# Patient Record
Sex: MALE | Race: OTHER | HISPANIC OR LATINO | Employment: UNEMPLOYED | URBAN - METROPOLITAN AREA
[De-identification: names, ages, dates, MRNs, and addresses within clinical notes are randomized per-mention and may not be internally consistent; named-entity substitution may affect disease eponyms.]

---

## 2024-10-02 PROCEDURE — 96376 TX/PRO/DX INJ SAME DRUG ADON: CPT

## 2024-10-02 PROCEDURE — 99291 CRITICAL CARE FIRST HOUR: CPT

## 2024-10-03 ENCOUNTER — HOSPITAL ENCOUNTER (INPATIENT)
Facility: HOSPITAL | Age: 29
LOS: 1 days | Discharge: HOME | DRG: 310 | End: 2024-10-04
Attending: STUDENT IN AN ORGANIZED HEALTH CARE EDUCATION/TRAINING PROGRAM | Admitting: HOSPITALIST

## 2024-10-03 DIAGNOSIS — I48.91 ATRIAL FIBRILLATION WITH RAPID VENTRICULAR RESPONSE: Primary | ICD-10-CM

## 2024-10-03 DIAGNOSIS — I48.91 ATRIAL FIBRILLATION WITH RVR: ICD-10-CM

## 2024-10-03 DIAGNOSIS — I48.91 A-FIB: ICD-10-CM

## 2024-10-03 DIAGNOSIS — R00.2 PALPITATIONS: ICD-10-CM

## 2024-10-03 LAB
ALBUMIN SERPL-MCNC: 3.8 G/DL (ref 3.5–5)
ALBUMIN SERPL-MCNC: 4.3 G/DL (ref 3.5–5)
ALBUMIN/GLOB SERPL: 1 RATIO (ref 1.1–2)
ALBUMIN/GLOB SERPL: 1 RATIO (ref 1.1–2)
ALP SERPL-CCNC: 100 UNIT/L (ref 40–150)
ALP SERPL-CCNC: 113 UNIT/L (ref 40–150)
ALT SERPL-CCNC: 24 UNIT/L (ref 0–55)
ALT SERPL-CCNC: 26 UNIT/L (ref 0–55)
AMPHET UR QL SCN: NEGATIVE
ANION GAP SERPL CALC-SCNC: 11 MEQ/L
ANION GAP SERPL CALC-SCNC: 6 MEQ/L
AST SERPL-CCNC: 24 UNIT/L (ref 5–34)
AST SERPL-CCNC: 25 UNIT/L (ref 5–34)
BARBITURATE SCN PRESENT UR: NEGATIVE
BASOPHILS # BLD AUTO: 0.02 X10(3)/MCL
BASOPHILS # BLD AUTO: 0.04 X10(3)/MCL
BASOPHILS NFR BLD AUTO: 0.3 %
BASOPHILS NFR BLD AUTO: 0.4 %
BENZODIAZ UR QL SCN: NEGATIVE
BILIRUB SERPL-MCNC: 0.3 MG/DL
BILIRUB SERPL-MCNC: 0.4 MG/DL
BNP BLD-MCNC: 12.4 PG/ML
BUN SERPL-MCNC: 11.1 MG/DL (ref 8.9–20.6)
BUN SERPL-MCNC: 13.4 MG/DL (ref 8.9–20.6)
CALCIUM SERPL-MCNC: 10 MG/DL (ref 8.4–10.2)
CALCIUM SERPL-MCNC: 9.3 MG/DL (ref 8.4–10.2)
CANNABINOIDS UR QL SCN: NEGATIVE
CHLORIDE SERPL-SCNC: 104 MMOL/L (ref 98–107)
CHLORIDE SERPL-SCNC: 110 MMOL/L (ref 98–107)
CO2 SERPL-SCNC: 23 MMOL/L (ref 22–29)
CO2 SERPL-SCNC: 25 MMOL/L (ref 22–29)
COCAINE UR QL SCN: NEGATIVE
CREAT SERPL-MCNC: 0.9 MG/DL (ref 0.73–1.18)
CREAT SERPL-MCNC: 0.98 MG/DL (ref 0.73–1.18)
CREAT/UREA NIT SERPL: 12
CREAT/UREA NIT SERPL: 14
EOSINOPHIL # BLD AUTO: 0.19 X10(3)/MCL (ref 0–0.9)
EOSINOPHIL # BLD AUTO: 0.21 X10(3)/MCL (ref 0–0.9)
EOSINOPHIL NFR BLD AUTO: 2.3 %
EOSINOPHIL NFR BLD AUTO: 2.9 %
ERYTHROCYTE [DISTWIDTH] IN BLOOD BY AUTOMATED COUNT: 13.2 % (ref 11.5–17)
ERYTHROCYTE [DISTWIDTH] IN BLOOD BY AUTOMATED COUNT: 13.3 % (ref 11.5–17)
FENTANYL UR QL SCN: NEGATIVE
GFR SERPLBLD CREATININE-BSD FMLA CKD-EPI: >60 ML/MIN/1.73/M2
GFR SERPLBLD CREATININE-BSD FMLA CKD-EPI: >60 ML/MIN/1.73/M2
GLOBULIN SER-MCNC: 3.8 GM/DL (ref 2.4–3.5)
GLOBULIN SER-MCNC: 4.3 GM/DL (ref 2.4–3.5)
GLUCOSE SERPL-MCNC: 101 MG/DL (ref 74–100)
GLUCOSE SERPL-MCNC: 112 MG/DL (ref 74–100)
HCT VFR BLD AUTO: 46.8 % (ref 42–52)
HCT VFR BLD AUTO: 48.2 % (ref 42–52)
HGB BLD-MCNC: 14.8 G/DL (ref 14–18)
HGB BLD-MCNC: 16 G/DL (ref 14–18)
IMM GRANULOCYTES # BLD AUTO: 0.03 X10(3)/MCL (ref 0–0.04)
IMM GRANULOCYTES # BLD AUTO: 0.05 X10(3)/MCL (ref 0–0.04)
IMM GRANULOCYTES NFR BLD AUTO: 0.5 %
IMM GRANULOCYTES NFR BLD AUTO: 0.6 %
INR PPP: 0.9
LYMPHOCYTES # BLD AUTO: 2.93 X10(3)/MCL (ref 0.6–4.6)
LYMPHOCYTES # BLD AUTO: 5.17 X10(3)/MCL (ref 0.6–4.6)
LYMPHOCYTES NFR BLD AUTO: 44.1 %
LYMPHOCYTES NFR BLD AUTO: 57.1 %
MAGNESIUM SERPL-MCNC: 1.9 MG/DL (ref 1.6–2.6)
MAGNESIUM SERPL-MCNC: 2.5 MG/DL (ref 1.6–2.6)
MCH RBC QN AUTO: 25.2 PG (ref 27–31)
MCH RBC QN AUTO: 26 PG (ref 27–31)
MCHC RBC AUTO-ENTMCNC: 31.6 G/DL (ref 33–36)
MCHC RBC AUTO-ENTMCNC: 33.2 G/DL (ref 33–36)
MCV RBC AUTO: 78.2 FL (ref 80–94)
MCV RBC AUTO: 79.7 FL (ref 80–94)
MDMA UR QL SCN: NEGATIVE
MONOCYTES # BLD AUTO: 0.63 X10(3)/MCL (ref 0.1–1.3)
MONOCYTES # BLD AUTO: 0.66 X10(3)/MCL (ref 0.1–1.3)
MONOCYTES NFR BLD AUTO: 7.3 %
MONOCYTES NFR BLD AUTO: 9.5 %
NEUTROPHILS # BLD AUTO: 2.85 X10(3)/MCL (ref 2.1–9.2)
NEUTROPHILS # BLD AUTO: 2.93 X10(3)/MCL (ref 2.1–9.2)
NEUTROPHILS NFR BLD AUTO: 32.3 %
NEUTROPHILS NFR BLD AUTO: 42.7 %
NRBC BLD AUTO-RTO: 0 %
NRBC BLD AUTO-RTO: 0 %
OHS QRS DURATION: 80 MS
OHS QRS DURATION: 80 MS
OHS QTC CALCULATION: 368 MS
OHS QTC CALCULATION: 452 MS
OPIATES UR QL SCN: NEGATIVE
PCP UR QL: NEGATIVE
PH UR: 5.5 [PH] (ref 3–11)
PHOSPHATE SERPL-MCNC: 1.9 MG/DL (ref 2.3–4.7)
PLATELET # BLD AUTO: 341 X10(3)/MCL (ref 130–400)
PLATELET # BLD AUTO: 356 X10(3)/MCL (ref 130–400)
PMV BLD AUTO: 9.3 FL (ref 7.4–10.4)
PMV BLD AUTO: 9.4 FL (ref 7.4–10.4)
POTASSIUM SERPL-SCNC: 3.1 MMOL/L (ref 3.5–5.1)
POTASSIUM SERPL-SCNC: 4.2 MMOL/L (ref 3.5–5.1)
PROT SERPL-MCNC: 7.6 GM/DL (ref 6.4–8.3)
PROT SERPL-MCNC: 8.6 GM/DL (ref 6.4–8.3)
PROTHROMBIN TIME: 12 SECONDS (ref 11.4–14)
RBC # BLD AUTO: 5.87 X10(6)/MCL (ref 4.7–6.1)
RBC # BLD AUTO: 6.16 X10(6)/MCL (ref 4.7–6.1)
SODIUM SERPL-SCNC: 139 MMOL/L (ref 136–145)
SODIUM SERPL-SCNC: 140 MMOL/L (ref 136–145)
SPECIFIC GRAVITY, URINE AUTO (.000) (OHS): 1.01 (ref 1–1.03)
TROPONIN I SERPL-MCNC: <0.01 NG/ML (ref 0–0.04)
TSH SERPL-ACNC: 3.39 UIU/ML (ref 0.35–4.94)
WBC # BLD AUTO: 6.65 X10(3)/MCL (ref 4.5–11.5)
WBC # BLD AUTO: 9.06 X10(3)/MCL (ref 4.5–11.5)

## 2024-10-03 PROCEDURE — 80307 DRUG TEST PRSMV CHEM ANLYZR: CPT

## 2024-10-03 PROCEDURE — 84484 ASSAY OF TROPONIN QUANT: CPT | Performed by: STUDENT IN AN ORGANIZED HEALTH CARE EDUCATION/TRAINING PROGRAM

## 2024-10-03 PROCEDURE — 25000003 PHARM REV CODE 250: Performed by: HOSPITALIST

## 2024-10-03 PROCEDURE — 80053 COMPREHEN METABOLIC PANEL: CPT | Performed by: STUDENT IN AN ORGANIZED HEALTH CARE EDUCATION/TRAINING PROGRAM

## 2024-10-03 PROCEDURE — 25000003 PHARM REV CODE 250

## 2024-10-03 PROCEDURE — 63600175 PHARM REV CODE 636 W HCPCS: Performed by: STUDENT IN AN ORGANIZED HEALTH CARE EDUCATION/TRAINING PROGRAM

## 2024-10-03 PROCEDURE — 25000003 PHARM REV CODE 250: Performed by: STUDENT IN AN ORGANIZED HEALTH CARE EDUCATION/TRAINING PROGRAM

## 2024-10-03 PROCEDURE — 84100 ASSAY OF PHOSPHORUS: CPT

## 2024-10-03 PROCEDURE — 85025 COMPLETE CBC W/AUTO DIFF WBC: CPT | Performed by: STUDENT IN AN ORGANIZED HEALTH CARE EDUCATION/TRAINING PROGRAM

## 2024-10-03 PROCEDURE — 93005 ELECTROCARDIOGRAM TRACING: CPT

## 2024-10-03 PROCEDURE — 83735 ASSAY OF MAGNESIUM: CPT | Performed by: STUDENT IN AN ORGANIZED HEALTH CARE EDUCATION/TRAINING PROGRAM

## 2024-10-03 PROCEDURE — 25000003 PHARM REV CODE 250: Performed by: INTERNAL MEDICINE

## 2024-10-03 PROCEDURE — 83880 ASSAY OF NATRIURETIC PEPTIDE: CPT

## 2024-10-03 PROCEDURE — 83735 ASSAY OF MAGNESIUM: CPT

## 2024-10-03 PROCEDURE — 96365 THER/PROPH/DIAG IV INF INIT: CPT

## 2024-10-03 PROCEDURE — 20000000 HC ICU ROOM

## 2024-10-03 PROCEDURE — 63600175 PHARM REV CODE 636 W HCPCS

## 2024-10-03 PROCEDURE — 96368 THER/DIAG CONCURRENT INF: CPT

## 2024-10-03 PROCEDURE — 99223 1ST HOSP IP/OBS HIGH 75: CPT | Mod: ,,, | Performed by: HOSPITALIST

## 2024-10-03 PROCEDURE — 96361 HYDRATE IV INFUSION ADD-ON: CPT

## 2024-10-03 PROCEDURE — 36415 COLL VENOUS BLD VENIPUNCTURE: CPT

## 2024-10-03 PROCEDURE — 84443 ASSAY THYROID STIM HORMONE: CPT | Performed by: STUDENT IN AN ORGANIZED HEALTH CARE EDUCATION/TRAINING PROGRAM

## 2024-10-03 PROCEDURE — 85610 PROTHROMBIN TIME: CPT | Performed by: STUDENT IN AN ORGANIZED HEALTH CARE EDUCATION/TRAINING PROGRAM

## 2024-10-03 PROCEDURE — 80053 COMPREHEN METABOLIC PANEL: CPT

## 2024-10-03 PROCEDURE — 85025 COMPLETE CBC W/AUTO DIFF WBC: CPT

## 2024-10-03 RX ORDER — SODIUM CHLORIDE, SODIUM LACTATE, POTASSIUM CHLORIDE, CALCIUM CHLORIDE 600; 310; 30; 20 MG/100ML; MG/100ML; MG/100ML; MG/100ML
INJECTION, SOLUTION INTRAVENOUS CONTINUOUS
Status: DISCONTINUED | OUTPATIENT
Start: 2024-10-03 | End: 2024-10-04 | Stop reason: HOSPADM

## 2024-10-03 RX ORDER — METOPROLOL TARTRATE 1 MG/ML
5 INJECTION, SOLUTION INTRAVENOUS ONCE
Status: COMPLETED | OUTPATIENT
Start: 2024-10-03 | End: 2024-10-03

## 2024-10-03 RX ORDER — INSULIN ASPART 100 [IU]/ML
0-5 INJECTION, SOLUTION INTRAVENOUS; SUBCUTANEOUS
Status: DISCONTINUED | OUTPATIENT
Start: 2024-10-03 | End: 2024-10-03

## 2024-10-03 RX ORDER — DILTIAZEM HYDROCHLORIDE 5 MG/ML
10 INJECTION INTRAVENOUS
Status: DISCONTINUED | OUTPATIENT
Start: 2024-10-03 | End: 2024-10-03

## 2024-10-03 RX ORDER — IBUPROFEN 200 MG
16 TABLET ORAL
Status: DISCONTINUED | OUTPATIENT
Start: 2024-10-03 | End: 2024-10-03

## 2024-10-03 RX ORDER — MUPIROCIN 20 MG/G
OINTMENT TOPICAL 2 TIMES DAILY
Status: DISCONTINUED | OUTPATIENT
Start: 2024-10-03 | End: 2024-10-04 | Stop reason: HOSPADM

## 2024-10-03 RX ORDER — METOPROLOL TARTRATE 50 MG/1
50 TABLET ORAL ONCE
Status: COMPLETED | OUTPATIENT
Start: 2024-10-03 | End: 2024-10-03

## 2024-10-03 RX ORDER — IBUPROFEN 200 MG
24 TABLET ORAL
Status: DISCONTINUED | OUTPATIENT
Start: 2024-10-03 | End: 2024-10-03

## 2024-10-03 RX ORDER — GLUCAGON 1 MG
1 KIT INJECTION
Status: DISCONTINUED | OUTPATIENT
Start: 2024-10-03 | End: 2024-10-03

## 2024-10-03 RX ORDER — ACETAMINOPHEN 325 MG/1
650 TABLET ORAL EVERY 4 HOURS PRN
Status: DISCONTINUED | OUTPATIENT
Start: 2024-10-03 | End: 2024-10-04 | Stop reason: HOSPADM

## 2024-10-03 RX ORDER — DILTIAZEM HYDROCHLORIDE 5 MG/ML
20 INJECTION INTRAVENOUS
Status: COMPLETED | OUTPATIENT
Start: 2024-10-03 | End: 2024-10-03

## 2024-10-03 RX ORDER — ENOXAPARIN SODIUM 100 MG/ML
40 INJECTION SUBCUTANEOUS EVERY 24 HOURS
Status: DISCONTINUED | OUTPATIENT
Start: 2024-10-03 | End: 2024-10-03

## 2024-10-03 RX ORDER — SODIUM CHLORIDE 0.9 % (FLUSH) 0.9 %
10 SYRINGE (ML) INJECTION
Status: DISCONTINUED | OUTPATIENT
Start: 2024-10-03 | End: 2024-10-04 | Stop reason: HOSPADM

## 2024-10-03 RX ORDER — ONDANSETRON 4 MG/1
8 TABLET, ORALLY DISINTEGRATING ORAL EVERY 8 HOURS PRN
Status: DISCONTINUED | OUTPATIENT
Start: 2024-10-03 | End: 2024-10-04 | Stop reason: HOSPADM

## 2024-10-03 RX ORDER — POTASSIUM CHLORIDE 20 MEQ/1
40 TABLET, EXTENDED RELEASE ORAL ONCE
Status: COMPLETED | OUTPATIENT
Start: 2024-10-03 | End: 2024-10-03

## 2024-10-03 RX ORDER — METOPROLOL TARTRATE 25 MG/1
25 TABLET, FILM COATED ORAL 2 TIMES DAILY
Status: DISCONTINUED | OUTPATIENT
Start: 2024-10-03 | End: 2024-10-04 | Stop reason: HOSPADM

## 2024-10-03 RX ORDER — MAGNESIUM SULFATE HEPTAHYDRATE 40 MG/ML
2 INJECTION, SOLUTION INTRAVENOUS
Status: COMPLETED | OUTPATIENT
Start: 2024-10-03 | End: 2024-10-03

## 2024-10-03 RX ADMIN — SODIUM CHLORIDE 1000 ML: 9 INJECTION, SOLUTION INTRAVENOUS at 12:10

## 2024-10-03 RX ADMIN — POTASSIUM CHLORIDE 40 MEQ: 1500 TABLET, EXTENDED RELEASE ORAL at 02:10

## 2024-10-03 RX ADMIN — METOPROLOL TARTRATE 50 MG: 50 TABLET, FILM COATED ORAL at 05:10

## 2024-10-03 RX ADMIN — METOPROLOL TARTRATE 25 MG: 25 TABLET, FILM COATED ORAL at 09:10

## 2024-10-03 RX ADMIN — MUPIROCIN: 20 OINTMENT TOPICAL at 09:10

## 2024-10-03 RX ADMIN — SODIUM CHLORIDE, POTASSIUM CHLORIDE, SODIUM LACTATE AND CALCIUM CHLORIDE: 600; 310; 30; 20 INJECTION, SOLUTION INTRAVENOUS at 03:10

## 2024-10-03 RX ADMIN — METOPROLOL TARTRATE 5 MG: 1 INJECTION, SOLUTION INTRAVENOUS at 05:10

## 2024-10-03 RX ADMIN — DILTIAZEM HYDROCHLORIDE 20 MG: 5 INJECTION INTRAVENOUS at 12:10

## 2024-10-03 RX ADMIN — RIVAROXABAN 20 MG: 10 TABLET, FILM COATED ORAL at 06:10

## 2024-10-03 RX ADMIN — DILTIAZEM HYDROCHLORIDE 5 MG/HR: 5 INJECTION INTRAVENOUS at 01:10

## 2024-10-03 RX ADMIN — ENOXAPARIN SODIUM 40 MG: 40 INJECTION SUBCUTANEOUS at 04:10

## 2024-10-03 RX ADMIN — SODIUM CHLORIDE 1000 ML: 9 INJECTION, SOLUTION INTRAVENOUS at 01:10

## 2024-10-03 RX ADMIN — MAGNESIUM SULFATE HEPTAHYDRATE 2 G: 40 INJECTION, SOLUTION INTRAVENOUS at 01:10

## 2024-10-03 RX ADMIN — SODIUM PHOSPHATE, MONOBASIC, MONOHYDRATE AND SODIUM PHOSPHATE, DIBASIC, ANHYDROUS 30 MMOL: 142; 276 INJECTION, SOLUTION INTRAVENOUS at 07:10

## 2024-10-03 NOTE — ED PROVIDER NOTES
Encounter Date: 10/2/2024       History     Chief Complaint   Patient presents with    Chest Pain     Pt states woke up with chest pain states it feels like his heart is pounding. Pt states also woke up SOB. EKG obtained hr: 184. Md at bedside.     Patient presents to the emergency department due to palpitations.  He was states it was started tonight aproximally 1 hour ago.  He denies any actual chest pain but he does feel short of breath.  Denies any previous cardiac history or known arrhythmias.    The history is provided by the patient.     Review of patient's allergies indicates:  No Known Allergies  History reviewed. No pertinent past medical history.  History reviewed. No pertinent surgical history.  No family history on file.  Social History     Tobacco Use    Smoking status: Never    Smokeless tobacco: Never   Substance Use Topics    Alcohol use: Never    Drug use: Never     Review of Systems   Constitutional:  Negative for chills and fever.   HENT:  Negative for congestion and sore throat.    Respiratory:  Positive for shortness of breath. Negative for cough.    Cardiovascular:  Positive for palpitations. Negative for chest pain.   Gastrointestinal:  Negative for abdominal pain and nausea.   Genitourinary:  Negative for dysuria and hematuria.   Musculoskeletal:  Negative for arthralgias and myalgias.   Neurological:  Negative for dizziness and weakness.       Physical Exam     Initial Vitals   BP Pulse Resp Temp SpO2   10/03/24 0008 10/03/24 0008 10/03/24 0008 10/03/24 0009 10/03/24 0008   (!) 171/93 (!) 184 20 98.7 °F (37.1 °C) 97 %      MAP       --                Physical Exam    Nursing note and vitals reviewed.  Constitutional: He appears well-developed and well-nourished.   HENT:   Head: Normocephalic and atraumatic.   Eyes: Conjunctivae are normal. Pupils are equal, round, and reactive to light.   Neck: Neck supple.   Normal range of motion.  Cardiovascular:            Tachycardic, irregularly  irregular rhythm   Pulmonary/Chest: Breath sounds normal. No respiratory distress.   Abdominal: Abdomen is soft. There is no abdominal tenderness.   Musculoskeletal:         General: No edema. Normal range of motion.      Cervical back: Normal range of motion and neck supple.     Neurological: He is alert and oriented to person, place, and time.   Skin: Skin is warm and dry.         ED Course   Critical Care    Date/Time: 10/3/2024 1:24 AM    Performed by: Jose Berg MD  Authorized by: Jose Berg MD  Direct patient critical care time: 15 minutes  Additional history critical care time: 0 minutes  Ordering / reviewing critical care time: 7 minutes  Documentation critical care time: 6 minutes  Consulting other physicians critical care time: 5 minutes  Consult with family critical care time: 0 minutes  Other critical care time: 0 minutes  Total critical care time (exclusive of procedural time) : 33 minutes  Critical care time was exclusive of separately billable procedures and treating other patients and teaching time.  Critical care was necessary to treat or prevent imminent or life-threatening deterioration of the following conditions: Atrial fibrilliation with RVR.  Critical care was time spent personally by me on the following activities: blood draw for specimens, development of treatment plan with patient or surrogate, discussions with consultants, interpretation of cardiac output measurements, evaluation of patient's response to treatment, examination of patient, obtaining history from patient or surrogate, ordering and performing treatments and interventions, ordering and review of laboratory studies, ordering and review of radiographic studies, pulse oximetry and re-evaluation of patient's condition.        Labs Reviewed   COMPREHENSIVE METABOLIC PANEL - Abnormal       Result Value    Sodium 140      Potassium 3.1 (*)     Chloride 104      CO2 25      Glucose 112 (*)     Blood Urea Nitrogen 13.4       Creatinine 0.98      Calcium 10.0      Protein Total 8.6 (*)     Albumin 4.3      Globulin 4.3 (*)     Albumin/Globulin Ratio 1.0 (*)     Bilirubin Total 0.4            ALT 26      AST 25      eGFR >60      Anion Gap 11.0      BUN/Creatinine Ratio 14     CBC WITH DIFFERENTIAL - Abnormal    WBC 9.06      RBC 6.16 (*)     Hgb 16.0      Hct 48.2      MCV 78.2 (*)     MCH 26.0 (*)     MCHC 33.2      RDW 13.2      Platelet 356      MPV 9.4      Neut % 32.3      Lymph % 57.1      Mono % 7.3      Eos % 2.3      Basophil % 0.4      Lymph # 5.17 (*)     Neut # 2.93      Mono # 0.66      Eos # 0.21      Baso # 0.04      IG# 0.05 (*)     IG% 0.6      NRBC% 0.0     MAGNESIUM - Normal    Magnesium Level 1.90     TROPONIN I - Normal    Troponin-I <0.010     TSH - Normal    TSH 3.386     PROTIME-INR - Normal    PT 12.0      INR 0.9     CBC W/ AUTO DIFFERENTIAL    Narrative:     The following orders were created for panel order CBC auto differential.  Procedure                               Abnormality         Status                     ---------                               -----------         ------                     CBC with Differential[[id number]]       Abnormal            Final result                 Please view results for these tests on the individual orders.     EKG Readings: (Independently Interpreted)   Initial Reading: No STEMI. Rhythm: Atrial Fibrillation. Heart Rate: 164. Ectopy: No Ectopy. Conduction: Normal. Axis: Normal.       Imaging Results              X-Ray Chest AP Portable (Preliminary result)  Result time 10/03/24 01:24:09      Wet Read by Jose Berg MD (10/03/24 01:24:09, [facility] - Emergency Dept, Emergency Medicine)    No obvious acute pulmonary process                                     Medications   diltiaZEM (CARDIZEM) 125 mg in D5W 125 mL infusion (7.5 mg/hr Intravenous Verify Only 10/3/24 0121)   diltiaZEM injection 10 mg (has no administration in time range)   diltiaZEM  injection 20 mg (20 mg Intravenous Given 10/3/24 0012)   sodium chloride 0.9% bolus 1,000 mL 1,000 mL (0 mLs Intravenous Stopped 10/3/24 0118)     Medical Decision Making  Presents AFib with RVR, blood pressure stable, initially given 20 mg of Cardizem with decent improvement to rates around 100-110.  EKGs without acute ischemic changes.  CBC, CMP, troponin reassuring.  Patient rate started to increase again therefore was placed on a Cardizem drip.  Discussed the patient with Internal Medicine who will admit for further evaluation    Amount and/or Complexity of Data Reviewed  Labs: ordered. Decision-making details documented in ED Course.  Radiology: ordered and independent interpretation performed. Decision-making details documented in ED Course.  ECG/medicine tests: ordered and independent interpretation performed. Decision-making details documented in ED Course.    Risk  Prescription drug management.  Decision regarding hospitalization.                                      Clinical Impression:  Final diagnoses:  [R00.2] Palpitations  [I48.91] Atrial fibrillation with rapid ventricular response (Primary)          ED Disposition Condition    Admit Stable                Jose Berg MD  10/03/24 0125

## 2024-10-03 NOTE — CONSULTS
Cardiology Consult Note     Admitting Team: 1  Attending Physician: Luis Islas MD  Cardiology Attending Physician: Naz Peoples MD    Date of Admit: 10/3/2024    Reason for Consult: Afib with RVR  Chest Pain (Pt states woke up with chest pain states it feels like his heart is pounding. Pt states also woke up SOB. EKG obtained hr: 184. Md at bedside.)       Subjective:      History of Present Illness:  Reji Mora is a 29 y.o. male with no known PMH who presented to the ED on 10/3/2024 with chief complaint of heart palpitations. Symptoms started few hours ago while watching TV prior to arriving to the ED. He denies any associated symptoms or prior episodes. He has never been to a doctor and is not taking any medications including supplements. He denies alcohol or tobacco use as well as increased caffeine use or recent stressful events. Denies family hx of Afib, heart disease or sudden death. No fever, chills, chest pain, tightness, leg swelling, claudication, cough, shortness of breath, abdominal pain, fatigue, dizziness, confusion, syncopal or LOC episodes, or any other symptoms.  Denies any recent infections, illness, hospitalizations. Reports having moved to the  from [facility] 10 months ago. He works for [employer] and reports no interruption in activity levels.     ED course: upon arrival, tachycardic to 184 and hypertensive to 171/93. Labs remarkable for mild hypokalemia ( 3.1) and borderline normal magnesium. Trop and TSH wnl. Initial EKG revealed AFIB with RVR. Diltiazem IV push given with improvement in HR. Rate later started going up again, started on Cardizem drip. Internal medicine consulted for admission for Afib RVR. Cardiology was consulted for the same.    Interval history  Patient was on cardizem drip till 5:30 a.m this morning when he was first down-titrated on the rate of drip and eventually, drip turned off due to him being hypotensive 85/59. Patient was  "prescribed metoprolol 25 mg PO BID. TTE performed. Through the morning, he was maintaining HR in the  range. Since 1 pm, his HR mikey into the 100-120 range and after 4 pm, he has been within the 150-170 range. Upon our interaction, he was still hypotensive 88/67 and -170.    History obtained by patient interview and supplemented by nursing documentation and chart review.     PMHx:   has no past medical history on file.   SurgHx:   has no past surgical history on file.   FamHx:  family history is not on file.   SocialHx:   reports that he has never smoked. He has never used smokeless tobacco. He reports that he does not drink alcohol and does not use drugs.  Allergies: No Known Allergies  Home Meds:    No current outpatient medications    Review of Systems:   Constitutional:  Negative for diaphoresis and fever.   Respiratory:  Negative for cough, shortness of breath and wheezing.    Cardiovascular:  Positive for palpitations. Negative for chest pain, orthopnea and PND.   Gastrointestinal:  Negative for nausea and vomiting.   Neurological:  Negative for dizziness, focal weakness, seizures and headaches.   Psychiatric/Behavioral:  Negative for substance abuse.    Objective:   Last 24 Hour Vital Signs:  BP  Min: 83/63  Max: 171/93  Temp  Av.3 °F (36.8 °C)  Min: 98 °F (36.7 °C)  Max: 98.7 °F (37.1 °C)  Pulse  Av.4  Min: 62  Max: 184  Resp  Av  Min: 15  Max: 25  SpO2  Av.2 %  Min: 97 %  Max: 100 %  Height  Av' 11" (180.3 cm)  Min: 5' 11" (180.3 cm)  Max: 5' 11" (180.3 cm)  Weight  Av.3 kg (243 lb 1.3 oz)  Min: 110.2 kg (243 lb)  Max: 110.3 kg (243 lb 2.7 oz)  Body mass index is 33.89 kg/m².  I/O last 3 completed shifts:  In: 2394.5 [I.V.:396.5; IV Piggyback:]  Out: -     Physical Examination:  Constitutional:       General: He is not in acute distress.  Eyes:      Pupils: Pupils are equal, round, and reactive to light.   Cardiovascular:      Rate and Rhythm: Tachycardia " present. Rhythm irregular.      Heart sounds: No murmur heard.     No gallop.      Comments: Rhythm: Irregularly irregular   Pulmonary:      Effort: Pulmonary effort is normal. No respiratory distress.      Breath sounds: Normal breath sounds. No wheezing or rales.   Abdominal:      General: Bowel sounds are normal.      Tenderness: There is no abdominal tenderness. There is no guarding.   Musculoskeletal:      Right lower leg: No edema.      Left lower leg: No edema.   Skin:     General: Skin is warm.      Findings: No lesion.   Neurological:      General: No focal deficit present.      Mental Status: He is alert and oriented to person, place, and time.   Psychiatric:         Mood and Affect: Mood normal.         Behavior: Behavior normal.         Thought Content: Thought content normal.         Judgment: Judgment normal.     Laboratory:  Recent Labs   Lab 10/03/24  0009 10/03/24  0337   WBC 9.06 6.65   HGB 16.0 14.8   HCT 48.2 46.8    341   MCV 78.2* 79.7*   RDW 13.2 13.3    139   K 3.1* 4.2    110*   CO2 25 23   BUN 13.4 11.1   CREATININE 0.98 0.90   ALBUMIN 4.3 3.8   BILITOT 0.4 0.3   AST 25 24   ALKPHOS 113 100   ALT 26 24       Cardiac Data:  Recent Labs   Lab 10/03/24  0009 10/03/24  0337   TROPONINI <0.010  --    BNP  --  12.4               Radiology:  X-Ray Chest AP Portable   ED Interpretation   No obvious acute pulmonary process      Final Result      No acute findings in the chest         Electronically signed by: Jose Reich MD   Date:    10/03/2024   Time:    11:26          Current Medications   metoprolol tartrate  25 mg Oral BID    mupirocin   Nasal BID    rivaroxaban  20 mg Oral Daily with dinner       dilTIAZem  0-15 mg/hr Intravenous Continuous   Stopped at 10/03/24 0604    lactated ringers   Intravenous Continuous 100 mL/hr at 10/03/24 1658 Rate Verify at 10/03/24 1658        Current Facility-Administered Medications:     acetaminophen, 650 mg, Oral, Q4H PRN    ondansetron, 8  mg, Oral, Q8H PRN    sodium chloride 0.9%, 10 mL, Intravenous, PRN     Assessment:     Reji Mora is a 29 y.o. male with no significant PMH who presented with complaints of palpitations. Cardiology was consulted for Afib RVR.     Active Hospital Problems    Diagnosis    *Atrial fibrillation with rapid ventricular response        Plan:     Afib RVR-new onset  Hypokalemia   Heart palpitations  - In ED, , /93  - EKG yielded Afib with RVR (atrial rate 312 bpm, ventr rate 117)  - Patient given cardizem IV push in ED, followed by cardizem drip  - Drip discontinued in a.m 2/2 hypotension 85/59.  - Patient was started on metoprolol 25 mg PO BID instead  - TTE performed, formal results to follow  - Upon interaction at 5:45 p.m - /71 and     Plan per cardiology  - Patient given 1 dose of metoprolol 50 mg PO and IV push.  - Maintain metoprolol dose at 25 mg PO BID through tonight, change to 50 mg TID in a.m tomorrow.  - If patient does not convert into sinus rhythm by tomorrow, we will consider cardioversion. NPO past midnight.  - Formal results of TTE to follow, but based on evident septal thickening, there is a high concern for HOCM.  - Start patient on Xarelto 20 mg QHS, despite CHADS2 score: 0 due to HOCM.  - Patient will likely need cardiac MRI in [place] or [place] post discharge, pending case management working out his insurance situation.   - Upon discharge, schedule outpatient follow up with Dr. Peoples in cardiology clinic.      We will continue to follow with you.    Aviva Diaz MD  [facility] Internal Medicine, PGY-1

## 2024-10-03 NOTE — H&P
Critical Care Medicine History and Physical Note   [facility] - Emergency Dept    Patient Name: Reji Mora  MRN: [id number]  Admission Date: 10/3/2024  Hospital Length of Stay: 0 days  Code Status: Full Code  Attending Provider: Luis Islas MD  Primary Care Provider: No primary care provider on file.   Principal Problem: Atrial fibrillation with rapid ventricular response    HPI:  29 year old Beninese speaking male with no significant PMH who presented to the ED on 10/3/2024 with chief complaint of heart palpitations. Symptoms started few hours ago while watching TV prior to arriving to the ED. He denies any associated symptoms. He has never been to a doctor and is not taking any medications including supplements. He denies alcohol and tobacco use. Denies family hx of Afib and heart disease. No fever, chills, chest pain, abdominal pain or shortness of breath.    ED course: upon arrival, tachycardic to 184 and hypertensive to 171/93. Labs remarkable for mild hypokalemia ( 3.1) and borderline normal magnesium. Trop and TSH wnl. Initial EKG revealed AFIB with RVR. Diltiazem IV push given with improvement in HR. Rate later started going up again, started on Cardizem drip. Internal medicine consulted for admission for Afib RVR      History reviewed. No pertinent past medical history.    History reviewed. No pertinent surgical history.      Social History     Socioeconomic History    Marital status: Other   Tobacco Use    Smoking status: Never    Smokeless tobacco: Never   Substance and Sexual Activity    Alcohol use: Never    Drug use: Never    Sexual activity: Not Currently       Drug Allergies:   Review of patient's allergies indicates:  No Known Allergies      Current Infusions:   dilTIAZem  0-15 mg/hr Intravenous Continuous 15 mL/hr at 10/03/24 0206 15 mg/hr at 10/03/24 0206    lactated ringers   Intravenous Continuous             Scheduled Medications:     enoxparin  40 mg Subcutaneous Daily     magnesium sulfate IVPB  2 g Intravenous ED 1 Time       PRN Medications:     Current Facility-Administered Medications:     acetaminophen, 650 mg, Oral, Q4H PRN    glucagon (human recombinant), 1 mg, Intramuscular, PRN    glucose, 16 g, Oral, PRN    glucose, 24 g, Oral, PRN    insulin aspart U-100, 0-5 Units, Subcutaneous, QID (AC + HS) PRN    ondansetron, 8 mg, Oral, Q8H PRN    sodium chloride 0.9%, 10 mL, Intravenous, PRN      Review of Systems   Constitutional:  Negative for diaphoresis and fever.   Respiratory:  Negative for cough, shortness of breath and wheezing.    Cardiovascular:  Positive for palpitations. Negative for chest pain, orthopnea and PND.   Gastrointestinal:  Negative for nausea and vomiting.   Neurological:  Negative for dizziness, focal weakness, seizures and headaches.   Psychiatric/Behavioral:  Negative for substance abuse.        Vital Signs:    Vitals:    10/03/24 0203   BP: 124/84   Pulse: 64   Resp: (!) 24   Temp:          Fluid Balance:     Intake/Output Summary (Last 24 hours) at 10/3/2024 0251  Last data filed at 10/3/2024 0206  Gross per 24 hour   Intake 9.67 ml   Output --   Net 9.67 ml       Physical Exam  Constitutional:       General: He is not in acute distress.  Eyes:      Pupils: Pupils are equal, round, and reactive to light.   Cardiovascular:      Rate and Rhythm: Tachycardia present. Rhythm irregular.      Heart sounds: No murmur heard.     No gallop.      Comments: Rhythm: Irregularly irregular   Pulmonary:      Effort: Pulmonary effort is normal. No respiratory distress.      Breath sounds: Normal breath sounds. No wheezing or rales.   Abdominal:      General: Bowel sounds are normal.      Tenderness: There is no abdominal tenderness. There is no guarding.   Musculoskeletal:      Right lower leg: No edema.      Left lower leg: No edema.   Skin:     General: Skin is warm.      Findings: No lesion.   Neurological:      General: No focal deficit present.      Mental  Status: He is alert and oriented to person, place, and time.   Psychiatric:         Mood and Affect: Mood normal.         Behavior: Behavior normal.         Thought Content: Thought content normal.         Judgment: Judgment normal.         Recent Labs   Lab 10/03/24  0009   WBC 9.06   RBC 6.16*   HGB 16.0   HCT 48.2      MCV 78.2*   MCH 26.0*   MCHC 33.2       Recent Labs   Lab 10/03/24  0009   GLUCOSE 112*      K 3.1*      CO2 25   BUN 13.4   CREATININE 0.98   CALCIUM 10.0   MG 1.90       Imaging reviewed:  No image results found.    2D ECHO Results      Assessment and Plan:    Assessment:  Afib RVR-new onset  Hypokalemia   Heart palpitations    Plan:  -CHADS2 score: 0. No anticoagulation indicated at this time  -Continue Cardizem drip, wean off as tolerated  -Consider Echo in AM  -Replete Lytes as needed  -IVF with LR @ 100 ml/hr      DVT ppx/tx with lovenox  GI ppx with protonix  Keep HOB elevated > 30*      The patient remains at high risk of decompensation and death and will remain in ICU level care    33 min of critical care time was spent reviewing the patient's chart including medications, radiographs, labs, pertinent cultures and pathology data, other consultant notes/recomendations as well as titration of vasopressors, adjustment of mechanical ventilatory or NIPPV support, as well as discussion of goals of care with nursing staff, respiratory therapy at the bedside and with family at the bedside/via phone.      Dequan Marcano MD  10/3/2024  Pulmonology/Critical Care

## 2024-10-03 NOTE — PLAN OF CARE
10/03/24 1118   Discharge Assessment   Assessment Type Discharge Planning Assessment   Confirmed/corrected address, phone number and insurance Yes   Confirmed Demographics Correct on Facesheet   Source of Information patient; utilized   Reason For Admission Palpitations, Atrial fibrillation with rapid ventricular response   People in Home other relative(s)   Facility Arrived From: Home   Do you expect to return to your current living situation? Yes   Do you have help at home or someone to help you manage your care at home? Yes   Who are your caregiver(s) and their phone number(s)? Cristhian Nicole (Relative)  [phone]   Walking or Climbing Stairs Difficulty no   Dressing/Bathing Difficulty no   Home Layout Able to live on 1st floor   Equipment Currently Used at Home none   Readmission within 30 days? No   Patient currently being followed by outpatient case management? No   Do you currently have service(s) that help you manage your care at home? No   Do you take prescription medications? No   Do you have prescription coverage? No   Do you have any problems affording any of your prescribed medications? TBD   Who is going to help you get home at discharge? Family   How do you get to doctors appointments? family or friend will provide   Are you on dialysis? No   Discharge Plan A Home with family   DME Needed Upon Discharge  none   Discharge Plan discussed with: Patient   Transition of Care Barriers Unisured   Physical Activity   On average, how many days per week do you engage in moderate to strenuous exercise (like a brisk walk)? 5 days   On average, how many minutes do you engage in exercise at this level? 130 min   Financial Resource Strain   How hard is it for you to pay for the very basics like food, housing, medical care, and heating? Not hard   Housing Stability   In the last 12 months, was there a time when you were not able to pay the mortgage or rent on time? N   At any time in the  "past 12 months, were you homeless or living in a shelter (including now)? N   Transportation Needs   Has the lack of transportation kept you from medical appointments, meetings, work or from getting things needed for daily living? No   Food Insecurity   Within the past 12 months, you worried that your food would run out before you got the money to buy more. Never true   Within the past 12 months, the food you bought just didn't last and you didn't have money to get more. Never true   Stress   Do you feel stress - tense, restless, nervous, or anxious, or unable to sleep at night because your mind is troubled all the time - these days? Not at all   Social Isolation   How often do you feel lonely or isolated from those around you?  Never   Truffls   In the past 12 months has the electric, gas, oil, or water company threatened to shut off services in your home? 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  "

## 2024-10-03 NOTE — PROGRESS NOTES
Inpatient Nutrition Assessment    Admit Date: 10/3/2024   Total duration of encounter: 1 day   Patient Age: 29 y.o.    Nutrition Recommendation/Prescription     Continue heart healthy diet  Pt education complete on diet tx;  used   MVI/fe  Biweekly wt  Will monitor nutrition status     Communication of Recommendations: reviewed with nurse and reviewed with patient    Nutrition Assessment     Malnutrition Assessment/Nutrition-Focused Physical Exam    Malnutrition Context: acute illness or injury (10/03/24 1245)  Malnutrition Level: other (see comments) (pt does not meet criteria) (10/03/24 1245)  Energy Intake (Malnutrition): other (see comments) (pt does not meet criteria) (10/03/24 1245)  Weight Loss (Malnutrition): other (see comments) (pt does not meet criteria) (10/03/24 1245)     Orbital Region (Subcutaneous Fat Loss): well nourished  Upper Arm Region (Subcutaneous Fat Loss): well nourished        Archie Region (Muscle Loss): well nourished  Clavicle Bone Region (Muscle Loss): well nourished                             A minimum of two characteristics is recommended for diagnosis of either severe or non-severe malnutrition.    Chart Review    Reason Seen: length of stay    Malnutrition Screening Tool Results   Have you recently lost weight without trying?: No  Have you been eating poorly because of a decreased appetite?: No   MST Score: 0   Diagnosis:  Afib, RVR, hypokalemia, heart palpitations     Relevant Medical History: none    Scheduled Medications:  enoxparin, 40 mg, Daily  metoprolol tartrate, 25 mg, BID  mupirocin, , BID    Continuous Infusions:  dilTIAZem, Last Rate: Stopped (10/03/24 0604)  lactated ringers, Last Rate: 100 mL/hr at 10/03/24 0645    PRN Medications:  acetaminophen, 650 mg, Q4H PRN  ondansetron, 8 mg, Q8H PRN  sodium chloride 0.9%, 10 mL, PRN    Calorie Containing IV Medications: no significant kcals from medications at this time    Recent Labs   Lab 10/03/24  0806  "10/03/24  0337    139   K 3.1* 4.2   CALCIUM 10.0 9.3   PHOS  --  1.9*   MG 1.90 2.50    110*   CO2 25 23   BUN 13.4 11.1   CREATININE 0.98 0.90   EGFRNORACEVR >60 >60   GLUCOSE 112* 101*   BILITOT 0.4 0.3   ALKPHOS 113 100   ALT 26 24   AST 25 24   ALBUMIN 4.3 3.8   WBC 9.06 6.65   HGB 16.0 14.8   HCT 48.2 46.8     Nutrition Orders:  Diet Heart Healthy      Appetite/Oral Intake: good/% of meals  Factors Affecting Nutritional Intake: none identified  Social Needs Impacting Access to Food: none identified  Food/Holiness/Cultural Preferences: none reported  Food Allergies: none reported  Last Bowel Movement: 10/02/24  Wound(s):  none    Comments    (10/2) Used --369004 for assessment. Pt reported good appetite; frequently eats high at foods; no wt loss; pt is obese--BMI 33.9. Encourage diet adherence to heart healthy diet.     Anthropometrics    Height: 5' 11" (180.3 cm), Height Method: Stated  Last Weight: 110.2 kg (243 lb) (10/03/24 1030), Weight Method: Standard Scale  BMI (Calculated): 33.9  BMI Classification: obese grade I (BMI 30-34.9)        Ideal Body Weight (IBW), Male: 172 lb     % Ideal Body Weight, Male (lb): 141.28 %         Usual Body Weight (UBW), k kg  % Usual Body Weight: 100.41     Usual Weight Provided By: patient and EMR weight history    Wt Readings from Last 5 Encounters:   10/03/24 110.2 kg (243 lb)     Weight Change(s) Since Admission: no wt loss   Wt Readings from Last 1 Encounters:   10/03/24 1030 110.2 kg (243 lb)   10/03/24 000 110.3 kg (243 lb 2.7 oz)   Admit Weight: 110.3 kg (243 lb 2.7 oz) (10/03/24 0008), Weight Method: Standard Scale    Estimated Needs    Weight Used For Calorie Calculations: 110 kg (242 lb 8.1 oz)  Energy Calorie Requirements (kcal): 2200 kcal/d; 20 kendrick/kg /obese  Energy Need Method: Kcal/kg  Weight Used For Protein Calculations: 78.1 kg (172 lb 2.9 oz) (IBW used for protein needs)  Protein Requirements: 101 gm protein/d; " 1.3 gm/kg IBW  Fluid Requirements (mL): 2200 ml/d; 1ml/kendrick        Enteral Nutrition     Patient not receiving enteral nutrition at this time.    Parenteral Nutrition     Patient not receiving parenteral nutrition support at this time.    Evaluation of Received Nutrient Intake    Calories: meeting estimated needs  Protein: meeting estimated needs    Patient Education     Education Provided: heart healthy diet  Teaching Method: explanation and printed materials  Comprehension: verbalizes understanding  Barriers to Learning: none evident  Expected Compliance: fair  Comments: All questions were answered and dietitian's contact information was provided.     Nutrition Diagnosis     PES: Food and nutrition related knowledge deficit related to lack of prior nutrition-related education as evidenced by new dx Afib. (new)     Nutrition Interventions     Intervention(s): modified composition of meals/snacks, multivitamin/mineral supplement therapy, purpose of nutrition education, and collaboration with other providers    Goal: Meet greater than 80% of nutritional needs by follow-up. (new)  Goal: Verbalize understanding of diet by discharge. (new)    Nutrition Goals & Monitoring     Dietitian will monitor: food and beverage intake, weight, food/nutrition knowledge skill, and glucose/endocrine profile  Discharge planning: continue heart healthy diet  Nutrition Risk/Follow-Up: low (follow-up in 5-7 days)   Please consult if re-assessment needed sooner.

## 2024-10-04 VITALS
TEMPERATURE: 98 F | OXYGEN SATURATION: 96 % | WEIGHT: 243 LBS | DIASTOLIC BLOOD PRESSURE: 65 MMHG | HEART RATE: 82 BPM | RESPIRATION RATE: 19 BRPM | HEIGHT: 71 IN | SYSTOLIC BLOOD PRESSURE: 114 MMHG | BODY MASS INDEX: 34.02 KG/M2

## 2024-10-04 LAB
ALBUMIN SERPL-MCNC: 3.6 G/DL (ref 3.5–5)
ALBUMIN/GLOB SERPL: 1 RATIO (ref 1.1–2)
ALP SERPL-CCNC: 86 UNIT/L (ref 40–150)
ALT SERPL-CCNC: 21 UNIT/L (ref 0–55)
ANION GAP SERPL CALC-SCNC: 8 MEQ/L
AST SERPL-CCNC: 23 UNIT/L (ref 5–34)
AV INDEX (PROSTH): 1.34
AV MEAN GRADIENT: 1.1 MMHG
AV PEAK GRADIENT: 2 MMHG
AV VALVE AREA BY VELOCITY RATIO: 4.5 CM²
AV VALVE AREA: 6.1 CM²
AV VELOCITY RATIO: 1
BILIRUB SERPL-MCNC: 0.5 MG/DL
BSA FOR ECHO PROCEDURE: 2.35 M2
BUN SERPL-MCNC: 13.5 MG/DL (ref 8.9–20.6)
CALCIUM SERPL-MCNC: 9.2 MG/DL (ref 8.4–10.2)
CHLORIDE SERPL-SCNC: 106 MMOL/L (ref 98–107)
CHOLEST SERPL-MCNC: 210 MG/DL
CHOLEST/HDLC SERPL: 6 {RATIO} (ref 0–5)
CO2 SERPL-SCNC: 25 MMOL/L (ref 22–29)
CREAT SERPL-MCNC: 1.16 MG/DL (ref 0.73–1.18)
CREAT/UREA NIT SERPL: 12
CV ECHO LV RWT: 0.58 CM
DOP CALC AO PEAK VEL: 0.7 M/S
DOP CALC AO VTI: 10.6 CM
DOP CALC LVOT AREA: 4.5 CM2
DOP CALC LVOT DIAMETER: 2.4 CM
DOP CALC LVOT PEAK VEL: 0.7 M/S
DOP CALC LVOT STROKE VOLUME: 64.2 CM3
DOP CALC MV VTI: 9.8 CM
DOP CALCLVOT PEAK VEL VTI: 14.2 CM
E WAVE DECELERATION TIME: 164.54 MSEC
E/A RATIO: 28
ECHO LV POSTERIOR WALL: 1.1 CM (ref 0.6–1.1)
FRACTIONAL SHORTENING: 31.6 % (ref 28–44)
GFR SERPLBLD CREATININE-BSD FMLA CKD-EPI: >60 ML/MIN/1.73/M2
GLOBULIN SER-MCNC: 3.6 GM/DL (ref 2.4–3.5)
GLUCOSE SERPL-MCNC: 88 MG/DL (ref 74–100)
HDLC SERPL-MCNC: 33 MG/DL (ref 35–60)
HOLD SPECIMEN: NORMAL
HR MV ECHO: 125 BPM
INTERVENTRICULAR SEPTUM: 1.6 CM (ref 0.6–1.1)
LDLC SERPL CALC-MCNC: 124 MG/DL (ref 50–140)
LEFT ATRIUM SIZE: 2.96 CM
LEFT ATRIUM VOLUME INDEX MOD: 18.3 ML/M2
LEFT ATRIUM VOLUME MOD: 42 ML
LEFT INTERNAL DIMENSION IN SYSTOLE: 2.6 CM (ref 2.1–4)
LEFT VENTRICLE DIASTOLIC VOLUME INDEX: 27.79 ML/M2
LEFT VENTRICLE DIASTOLIC VOLUME: 63.65 ML
LEFT VENTRICLE MASS INDEX: 80.1 G/M2
LEFT VENTRICLE SYSTOLIC VOLUME INDEX: 10.9 ML/M2
LEFT VENTRICLE SYSTOLIC VOLUME: 25.02 ML
LEFT VENTRICULAR INTERNAL DIMENSION IN DIASTOLE: 3.8 CM (ref 3.5–6)
LEFT VENTRICULAR MASS: 183.4 G
LV LATERAL E/E' RATIO: 4.67 M/S
LVED V (TEICH): 63.65 ML
LVES V (TEICH): 25.02 ML
LVOT MG: 1.11 MMHG
LVOT MV: 0.48 CM/S
MAGNESIUM SERPL-MCNC: 2.1 MG/DL (ref 1.6–2.6)
MV MEAN GRADIENT: 1 MMHG
MV PEAK A VEL: 0.02 M/S
MV PEAK E VEL: 0.56 M/S
MV PEAK GRADIENT: 2 MMHG
MV STENOSIS PRESSURE HALF TIME: 47.72 MS
MV VALVE AREA BY CONTINUITY EQUATION: 6.55 CM2
MV VALVE AREA P 1/2 METHOD: 4.61 CM2
PHOSPHATE SERPL-MCNC: 3.4 MG/DL (ref 2.3–4.7)
POTASSIUM SERPL-SCNC: 3.8 MMOL/L (ref 3.5–5.1)
PROT SERPL-MCNC: 7.2 GM/DL (ref 6.4–8.3)
RA PRESSURE ESTIMATED: 3 MMHG
RIGHT VENTRICULAR END-DIASTOLIC DIMENSION: 2.34 CM
SINUS: 3.2 CM
SODIUM SERPL-SCNC: 139 MMOL/L (ref 136–145)
TDI LATERAL: 0.12 M/S
TRICUSPID ANNULAR PLANE SYSTOLIC EXCURSION: 1.78 CM
TRIGL SERPL-MCNC: 263 MG/DL (ref 34–140)
VLDLC SERPL CALC-MCNC: 53 MG/DL
Z-SCORE OF LEFT VENTRICULAR DIMENSION IN END DIASTOLE: -8.34
Z-SCORE OF LEFT VENTRICULAR DIMENSION IN END SYSTOLE: -5.6

## 2024-10-04 PROCEDURE — 83735 ASSAY OF MAGNESIUM: CPT

## 2024-10-04 PROCEDURE — 80053 COMPREHEN METABOLIC PANEL: CPT

## 2024-10-04 PROCEDURE — 94761 N-INVAS EAR/PLS OXIMETRY MLT: CPT

## 2024-10-04 PROCEDURE — 36415 COLL VENOUS BLD VENIPUNCTURE: CPT | Performed by: HOSPITALIST

## 2024-10-04 PROCEDURE — 36415 COLL VENOUS BLD VENIPUNCTURE: CPT

## 2024-10-04 PROCEDURE — 25000003 PHARM REV CODE 250: Performed by: HOSPITALIST

## 2024-10-04 PROCEDURE — 25000003 PHARM REV CODE 250

## 2024-10-04 PROCEDURE — 84100 ASSAY OF PHOSPHORUS: CPT

## 2024-10-04 PROCEDURE — 80061 LIPID PANEL: CPT

## 2024-10-04 PROCEDURE — 99233 SBSQ HOSP IP/OBS HIGH 50: CPT | Mod: ,,, | Performed by: INTERNAL MEDICINE

## 2024-10-04 RX ORDER — METOPROLOL SUCCINATE 50 MG/1
50 TABLET, EXTENDED RELEASE ORAL DAILY
Qty: 90 TABLET | Refills: 3 | Status: SHIPPED | OUTPATIENT
Start: 2024-10-04 | End: 2025-10-04

## 2024-10-04 RX ORDER — METOPROLOL TARTRATE 25 MG/1
25 TABLET, FILM COATED ORAL 2 TIMES DAILY
Qty: 180 TABLET | Refills: 3 | Status: SHIPPED | OUTPATIENT
Start: 2024-10-04 | End: 2024-10-04 | Stop reason: HOSPADM

## 2024-10-04 RX ADMIN — METOPROLOL TARTRATE 25 MG: 25 TABLET, FILM COATED ORAL at 08:10

## 2024-10-04 RX ADMIN — MUPIROCIN: 20 OINTMENT TOPICAL at 08:10

## 2024-10-04 NOTE — DISCHARGE SUMMARY
[facility] Discharge Summary    Admitting Physician: Luis Islas MD  Attending Physician: Luis Islas MD  Date of Admit: 10/3/2024  Date of Discharge: 10/4/2024    Condition: Good  Outcome: Condition has improved and patient is now ready for discharge.  DISPOSITION: Home or Self Care          Discharge Diagnoses     Patient Active Problem List   Diagnosis    Atrial fibrillation with rapid ventricular response       Principal Problem:  Atrial fibrillation with rapid ventricular response    Consultants and Procedures     Consultants:  IP CONSULT TO INTERNAL MEDICINE  IP CONSULT TO CARDIOLOGY    Procedures:   * No surgery found *     Brief Admission History      29 year old [place]yan speaking male with no significant PMH who presented to the ED on 10/3/2024 with chief complaint of heart palpitations. Symptoms started few hours ago while watching TV prior to arriving to the ED. He denies any associated symptoms. He has never been to a doctor and is not taking any medications including supplements. He denies alcohol and tobacco use. Denies family hx of Afib and heart disease. No fever, chills, chest pain, abdominal pain or shortness of breath.     ED course: upon arrival, tachycardic to 184 and hypertensive to 171/93. Labs remarkable for mild hypokalemia ( 3.1) and borderline normal magnesium. Trop and TSH wnl. Initial EKG revealed AFIB with RVR. Diltiazem IV push given with improvement in HR. Rate later started going up again, started on Cardizem drip. Internal medicine consulted for admission for Afib RVR    Hospital Course with Pertinent Findings     The patient was admitted to the ICU for diltiazem drip, he had good response but started having hypo tension and pauses on his monitor for which the drip was discontinued, patient was started on oral Lopressor 25 b.i.d., he continued to be tachycardic with AFib throughout the day, on the 2nd day of admission the patient converted to normal sinus  "rhythm.  An echo with bubble study was done and showed no shunt, normal systolic function, no atrial enlargement, but it did show septal thickness suggesting HOCM.  Cardiology was consulted and recommended starting Xarelto, they wanted to follow up with the patient in the clinic to arrange for cardiac MRI.  Patient is stable for discharge, ED precautions were provided, patient to continue taking Xarelto and metoprolol succinate 50 daily.    Discharge physical exam:  Vitals  BP: 126/73  Temp: 98.4 °F (36.9 °C)  Temp Source: Oral  Pulse: 85  Resp: (!) 23  SpO2: 96 %  Height: 5' 11" (180.3 cm)  Weight: 110.2 kg (243 lb)    Physical Exam  Constitutional:       Appearance: Normal appearance.   Eyes:      Extraocular Movements: Extraocular movements intact.      Pupils: Pupils are equal, round, and reactive to light.   Cardiovascular:      Rate and Rhythm: Normal rate and regular rhythm.      Pulses: Normal pulses.      Heart sounds: Normal heart sounds.   Pulmonary:      Effort: Pulmonary effort is normal.      Breath sounds: Normal breath sounds.   Abdominal:      General: Bowel sounds are normal.      Palpations: Abdomen is soft.   Musculoskeletal:         General: Normal range of motion.   Skin:     General: Skin is warm and dry.      Capillary Refill: Capillary refill takes less than 2 seconds.   Neurological:      General: No focal deficit present.      Mental Status: He is alert and oriented to person, place, and time.         TIME SPENT ON DISCHARGE: 60 minutes    Discharge Medications        Medication List        START taking these medications      metoprolol succinate 50 MG 24 hr tablet  Commonly known as: TOPROL-XL  Take 1 tablet (50 mg total) by mouth once daily.     rivaroxaban 20 mg Tab  Commonly known as: XARELTO  Take 1 tablet (20 mg total) by mouth daily with dinner or evening meal.               Where to Get Your Medications        These medications were sent to [facility] - " [place], [place] - [address]  [address] [address]      Phone: [phone]   metoprolol succinate 50 MG 24 hr tablet  rivaroxaban 20 mg Tab         Discharge Information:     Start metoprolol 50 mg daily  Start rivaroxaban 20 mg daily, we will send a month of supply with 2 refills, Cardiology to decide for how long he will be on anticoagulation  Follow up with the cardiology clinic  Follow up in the post wards clinic    Nandini Suarez MD  Internal Medicine - PGY-2

## 2024-10-04 NOTE — PROGRESS NOTES
[facility] - ICU  Pulmonary Critical Care Note    Patient Name: Reji Mora  MRN: [id number]  Admission Date: 10/3/2024  Hospital Length of Stay: 1 days  Code Status: Full Code  Attending Provider: Luis Islas MD  Primary Care Provider: Delia, Primary Doctor     Subjective:     HPI:   29 year old Moh[place] speaking male with no significant PMH who presented to the ED on 10/3/2024 with chief complaint of heart palpitations. Symptoms started few hours ago while watching TV prior to arriving to the ED. He denies any associated symptoms. He has never been to a doctor and is not taking any medications including supplements. He denies alcohol and tobacco use. Denies family hx of Afib and heart disease. No fever, chills, chest pain, abdominal pain or shortness of breath.     ED course: upon arrival, tachycardic to 184 and hypertensive to 171/93. Labs remarkable for mild hypokalemia ( 3.1) and borderline normal magnesium. Trop and TSH wnl. Initial EKG revealed AFIB with RVR. Diltiazem IV push given with improvement in HR. Rate later started going up again, started on Cardizem drip. Internal medicine consulted for admission for Afib RVR    Hospital Course/Significant events:  10/03/2024 admission to the ICU    24 Hour Interval History:  Diltiazem drip was stopped yesterday after the patient started becoming hypotensive and having pauses, he continue to be in A. Fib with RVR yesterday, was started on oral lopressor which did help control his rate slightly, he then converted to sinus rhythm this morning, we will watch over the morning and downgrade to the floor later today if continues to be in sinus. Cardiology evaluated and recommended titrating the lopressor, though the patient in sinus rhythm now with rate in the 60s.    History reviewed. No pertinent past medical history.    History reviewed. No pertinent surgical history.    Social History     Socioeconomic History    Marital status: Other   Tobacco Use     Smoking status: Never    Smokeless tobacco: Never   Substance and Sexual Activity    Alcohol use: Never    Drug use: Never    Sexual activity: Not Currently     Social Drivers of Health     Financial Resource Strain: Low Risk  (10/3/2024)    Overall Financial Resource Strain (CARDIA)     Difficulty of Paying Living Expenses: Not hard at all   Food Insecurity: No Food Insecurity (10/3/2024)    Hunger Vital Sign     Worried About Running Out of Food in the Last Year: Never true     Ran Out of Food in the Last Year: Never true   Transportation Needs: No Transportation Needs (10/3/2024)    TRANSPORTATION NEEDS     Transportation : No   Physical Activity: Sufficiently Active (10/3/2024)    Exercise Vital Sign     Days of Exercise per Week: 5 days     Minutes of Exercise per Session: 130 min   Stress: No Stress Concern Present (10/3/2024)    [employer] [place] [employer] - Occupational Stress Questionnaire     Feeling of Stress : Not at all   Housing Stability: Low Risk  (10/3/2024)    Housing Stability Vital Sign     Unable to Pay for Housing in the Last Year: No     Homeless in the Last Year: No           Objective:   No current outpatient medications    Current Inpatient Medications   metoprolol tartrate  25 mg Oral BID    mupirocin   Nasal BID    rivaroxaban  20 mg Oral Daily with dinner           Intake/Output Summary (Last 24 hours) at 10/4/2024 0644  Last data filed at 10/3/2024 1703  Gross per 24 hour   Intake 1707.59 ml   Output 2300 ml   Net -592.41 ml       ROS     Vital Signs (Most Recent):  Temp: 98.5 °F (36.9 °C) (10/04/24 0500)  Pulse: 68 (10/04/24 0500)  Resp: 19 (10/04/24 0500)  BP: 106/74 (10/04/24 0500)  SpO2: 97 % (10/04/24 0500)  Body mass index is 33.89 kg/m².  Weight: 110.2 kg (243 lb) Vital Signs (24h Range):  Temp:  [98 °F (36.7 °C)-98.6 °F (37 °C)] 98.5 °F (36.9 °C)  Pulse:  [] 68  Resp:  [14-25] 19  SpO2:  [97 %-100 %] 97 %  BP: ()/(49-95) 106/74     Physical  "Exam      Mechanical ventilation support:       Lines/Drains/Airways       Peripheral Intravenous Line  Duration                  Peripheral IV - Single Lumen 10/03/24 0017 18 G Anterior;Distal;Right Upper Arm 1 day         Peripheral IV - Single Lumen 10/03/24 0210 20 G Posterior;Right Hand 1 day                    Significant Labs:    Lab Results   Component Value Date    WBC 6.65 10/03/2024    HGB 14.8 10/03/2024    HCT 46.8 10/03/2024    MCV 79.7 (L) 10/03/2024     10/03/2024         BMP  Lab Results   Component Value Date     10/04/2024    K 3.8 10/04/2024     10/04/2024    CO2 25 10/04/2024    BUN 13.5 10/04/2024    CREATININE 1.16 10/04/2024    CALCIUM 9.2 10/04/2024       ABG  No results for input(s): "PH", "PO2", "PCO2", "HCO3", "BE" in the last 168 hours.        Significant Imaging:  I have reviewed all pertinent imaging within the past 24 hours.        Assessment/Plan:     Assessment  New onset A. Fib with RVR - resloved  Palpitations  HOCM      Plan  Patient converted to sinus rhythm this morning, continue Lopressor at 25 bid  Echo with bubble showed no shunt, normal systolic function, septal thickening concerning for HOCM, normal PASP  Continue Xarelto 20 mg QHS per cardiology  Keep K > 4, PO4 > 3, Mg > 2  Cariology arranging for out patient cardiac MRI  Will downgrade the patient later today if continues to be in sinus rhythm    DVT Prophylaxis: xarelto  GI Prophylaxis: none          Nandini Suarez MD  Pulmonary Critical Care Medicine  [facility] - ICU    "

## 2024-10-14 ENCOUNTER — OFFICE VISIT (OUTPATIENT)
Dept: INTERNAL MEDICINE | Facility: CLINIC | Age: 29
End: 2024-10-14

## 2024-10-14 VITALS
RESPIRATION RATE: 20 BRPM | WEIGHT: 241.63 LBS | BODY MASS INDEX: 33.83 KG/M2 | HEIGHT: 71 IN | DIASTOLIC BLOOD PRESSURE: 73 MMHG | HEART RATE: 80 BPM | TEMPERATURE: 98 F | SYSTOLIC BLOOD PRESSURE: 121 MMHG | OXYGEN SATURATION: 96 %

## 2024-10-14 DIAGNOSIS — I48.91 ATRIAL FIBRILLATION WITH RAPID VENTRICULAR RESPONSE: ICD-10-CM

## 2024-10-14 DIAGNOSIS — R00.2 PALPITATIONS: ICD-10-CM

## 2024-10-14 PROCEDURE — 99214 OFFICE O/P EST MOD 30 MIN: CPT | Mod: PBBFAC

## 2024-10-14 RX ORDER — METOPROLOL SUCCINATE 50 MG/1
50 TABLET, EXTENDED RELEASE ORAL DAILY
Qty: 90 TABLET | Refills: 3 | Status: SHIPPED | OUTPATIENT
Start: 2024-10-14 | End: 2025-10-14

## 2024-10-14 NOTE — PROGRESS NOTES
I have reviewed and agree with the resident's findings, including all diagnostic interpretations and plans as written.    Pt with newly dx afib with RVR and severe septal thickening on echo with concern for HOCM who presents today for recent admission. Currently on Xarelto and BB however pt continues to complain of palpitations at nighttime. HR at goal  today. Recommend splitting Toprol to 25 mg bid. Pt to establish care at next visit. Referral placed to cardiology however no note established yet. Rest per resident note.    Zabrina Fung MD

## 2024-10-14 NOTE — PROGRESS NOTES
"[facility] Post Wards Visit    Chief Complaint:      Post wards follow-up    Subjective:     HPI:  Reji Mora is a 29 y.o. male who  has no past medical history on file.  He was admitted to Inpatient Medicine on 10/3/24 for AFib with RVR. He was discharged in Home or Self Care on 10/4/24.  He presents to clinic today for post wards follow-up.  During that admission he was hospitalized in the ICU started on diltiazem drip with good response.  He was discharged the following day.  Bubble study was negative although did find septal thickness suggesting [place].  Cardiology was consulted and patient was discharged home on Toprol 50 mg q.d., Xarelto 20 mg q.d..    Patient was still complaining of palpitations lasting a couple of minutes at a time.  He states this happens mostly at nighttime.  Reports compliance with his Toprol and Xarelto.  No new complaints/concerns.  Denies any dyspnea, weakness numbness, orthopnea.      Moved from [facility] to here 10 or so months ago. Denies any family history of cardiac disease.  He is not a smoker and does not use illicit substances.  States he drinks 2 alcoholic beverages every 3-4 months.    Review of Systems  Review of Systems   Constitutional:  Negative for chills and fever.   Respiratory:  Negative for cough and shortness of breath.    Cardiovascular:  Positive for palpitations. Negative for chest pain, orthopnea and leg swelling.   Gastrointestinal:  Negative for abdominal pain, nausea and vomiting.   Genitourinary:  Negative for flank pain and hematuria.   Skin:  Negative for itching and rash.   Neurological:  Negative for weakness and headaches.         Objective:   Last 24 Hour Vital Signs:  Vitals  BP: 121/73  Temp: 98.4 °F (36.9 °C)  Temp Source: Oral  Pulse: 80  Resp: 20  SpO2: 96 %  Height: 5' 11" (180.3 cm)  Weight: 109.6 kg (241 lb 9.6 oz)    Physical Examination:  Vital signs and nursing notes reviewed.  Constitutional: Patient is in NAD. Awake and " alert. Well-developed and well-nourished.  Head: Atraumatic. Normocephalic.  Eyes: Conjunctivae nl. No scleral icterus.  ENT: Mucous membranes are moist. Oropharynx is clear.  Cardiovascular: Regular rate and rhythm. No murmurs, rubs, or gallops. Distal pulses are 2+ and symmetric .  Pulmonary/Chest: No respiratory distress. Clear to auscultation bilaterally. No wheezing, rales, or rhonchi.  Abdominal: Soft. Non-distended. No TTP. No rebound, guarding, or rigidity.   Musculoskeletal: Moves all extremities. No edema.   Skin: Warm and dry.  Neurological: Awake and alert. No acute focal neurological deficits are appreciated.        Assessment & Plan:       Encounter for post wards follow up  Echocardiographic examination with increased septal thickness with potential concern for home.  Has been referred to outpatient cards.   Continue taking metoprolol, rivaroxaban as prescribed.  Recommended patient take his metoprolol dose divided into b.i.d. regimen.  His palpitations are mostly occurring in the nighttime hours.      Follow up with PCP 1 month.        Robert Ward DO  [facility], -III